# Patient Record
Sex: MALE | Race: WHITE | NOT HISPANIC OR LATINO | Employment: FULL TIME | URBAN - METROPOLITAN AREA
[De-identification: names, ages, dates, MRNs, and addresses within clinical notes are randomized per-mention and may not be internally consistent; named-entity substitution may affect disease eponyms.]

---

## 2017-09-26 ENCOUNTER — HOSPITAL ENCOUNTER (OUTPATIENT)
Dept: LAB | Facility: MEDICAL CENTER | Age: 37
End: 2017-09-26
Payer: COMMERCIAL

## 2017-09-26 LAB
BDY FAT % MEASURED: 15.6 %
BP DIAS: 75 MMHG
BP SYS: 133 MMHG
CHOLEST SERPL-MCNC: 156 MG/DL (ref 100–199)
DIABETES HTDIA: NO
EVENT NAME HTEVT: NORMAL
FASTING HTFAS: YES
GLUCOSE SERPL-MCNC: 77 MG/DL (ref 65–99)
HDLC SERPL-MCNC: 59 MG/DL
HYPERTENSION HTHYP: NO
LDLC SERPL CALC-MCNC: 76 MG/DL
SCREENING LOC CITY HTCIT: NORMAL
SCREENING LOC STATE HTSTA: NORMAL
SCREENING LOCATION HTLOC: NORMAL
SMOKING HTSMO: NO
SUBSCRIBER ID HTSID: NORMAL
TRIGL SERPL-MCNC: 107 MG/DL (ref 0–149)

## 2017-09-26 PROCEDURE — 80061 LIPID PANEL: CPT

## 2017-09-26 PROCEDURE — S5190 WELLNESS ASSESSMENT BY NONPH: HCPCS

## 2017-09-26 PROCEDURE — 82947 ASSAY GLUCOSE BLOOD QUANT: CPT

## 2017-09-26 PROCEDURE — 36415 COLL VENOUS BLD VENIPUNCTURE: CPT

## 2018-09-25 ENCOUNTER — HOSPITAL ENCOUNTER (OUTPATIENT)
Dept: LAB | Facility: MEDICAL CENTER | Age: 38
End: 2018-09-25
Payer: COMMERCIAL

## 2018-09-25 LAB
BDY FAT % MEASURED: 13 %
BP DIAS: 73 MMHG
BP SYS: 119 MMHG
CHOLEST SERPL-MCNC: 212 MG/DL (ref 100–199)
DIABETES HTDIA: NO
EVENT NAME HTEVT: NORMAL
FASTING HTFAS: YES
GLUCOSE SERPL-MCNC: 115 MG/DL (ref 65–99)
HDLC SERPL-MCNC: 72 MG/DL
HYPERTENSION HTHYP: NO
LDLC SERPL CALC-MCNC: 124 MG/DL
SCREENING LOC CITY HTCIT: NORMAL
SCREENING LOC STATE HTSTA: NORMAL
SCREENING LOCATION HTLOC: NORMAL
SMOKING HTSMO: NO
SUBSCRIBER ID HTSID: NORMAL
TRIGL SERPL-MCNC: 80 MG/DL (ref 0–149)

## 2018-09-25 PROCEDURE — 80061 LIPID PANEL: CPT

## 2018-09-25 PROCEDURE — 82947 ASSAY GLUCOSE BLOOD QUANT: CPT

## 2018-09-25 PROCEDURE — 36415 COLL VENOUS BLD VENIPUNCTURE: CPT

## 2018-09-25 PROCEDURE — S5190 WELLNESS ASSESSMENT BY NONPH: HCPCS

## 2020-01-02 PROBLEM — M25.562 KNEE PAIN, LEFT: Status: ACTIVE | Noted: 2020-01-02

## 2020-02-17 PROBLEM — N45.2 ORCHITIS: Status: ACTIVE | Noted: 2020-02-17

## 2020-02-17 PROBLEM — R35.0 FREQUENCY OF MICTURITION: Status: ACTIVE | Noted: 2020-02-17

## 2022-04-26 ENCOUNTER — APPOINTMENT (RX ONLY)
Dept: URBAN - METROPOLITAN AREA CLINIC 38 | Facility: CLINIC | Age: 42
Setting detail: DERMATOLOGY
End: 2022-04-26

## 2022-04-26 DIAGNOSIS — Z71.89 OTHER SPECIFIED COUNSELING: ICD-10-CM

## 2022-04-26 DIAGNOSIS — D18.0 HEMANGIOMA: ICD-10-CM | Status: STABLE

## 2022-04-26 DIAGNOSIS — L81.4 OTHER MELANIN HYPERPIGMENTATION: ICD-10-CM | Status: STABLE

## 2022-04-26 DIAGNOSIS — D22 MELANOCYTIC NEVI: ICD-10-CM | Status: STABLE

## 2022-04-26 PROBLEM — D22.9 MELANOCYTIC NEVI, UNSPECIFIED: Status: ACTIVE | Noted: 2022-04-26

## 2022-04-26 PROBLEM — D18.01 HEMANGIOMA OF SKIN AND SUBCUTANEOUS TISSUE: Status: ACTIVE | Noted: 2022-04-26

## 2022-04-26 PROCEDURE — 99203 OFFICE O/P NEW LOW 30 MIN: CPT

## 2022-04-26 PROCEDURE — ? PHOTO-DOCUMENTATION

## 2022-04-26 PROCEDURE — ? COUNSELING

## 2022-04-26 ASSESSMENT — LOCATION SIMPLE DESCRIPTION DERM
LOCATION SIMPLE: LEFT ANTERIOR NECK
LOCATION SIMPLE: POSTERIOR NECK
LOCATION SIMPLE: ABDOMEN
LOCATION SIMPLE: INFERIOR FOREHEAD
LOCATION SIMPLE: LEFT FOREARM
LOCATION SIMPLE: RIGHT FOREARM

## 2022-04-26 ASSESSMENT — LOCATION DETAILED DESCRIPTION DERM
LOCATION DETAILED: PERIUMBILICAL SKIN
LOCATION DETAILED: RIGHT MEDIAL TRAPEZIAL NECK
LOCATION DETAILED: INFERIOR MID FOREHEAD
LOCATION DETAILED: RIGHT PROXIMAL DORSAL FOREARM
LOCATION DETAILED: LEFT CLAVICULAR NECK
LOCATION DETAILED: LEFT DISTAL DORSAL FOREARM

## 2022-04-26 ASSESSMENT — LOCATION ZONE DERM
LOCATION ZONE: ARM
LOCATION ZONE: NECK
LOCATION ZONE: TRUNK
LOCATION ZONE: FACE

## 2022-04-26 NOTE — HPI: FULL BODY SKIN EXAMINATION
How Severe Are Your Spot(S)?: mild
What Type Of Note Output Would You Prefer (Optional)?: Bullet Format
What Is The Reason For Today's Visit?: Full Body Skin Examination
What Is The Reason For Today's Visit? (Being Monitored For X): concerning skin lesions on an annual basis
Additional History: Patient wants back looked at due to spots he feels.

## 2022-04-26 NOTE — PROCEDURE: PHOTO-DOCUMENTATION
Photo Preface (Leave Blank If You Do Not Want): Photographs were obtained today
Details (Free Text): Photos of back were taken.
Detail Level: Zone

## 2023-09-26 PROBLEM — N45.2 ORCHITIS: Status: RESOLVED | Noted: 2020-02-17 | Resolved: 2023-09-26

## 2023-09-26 PROBLEM — S82.002D CLOSED NONDISPLACED FRACTURE OF LEFT PATELLA WITH ROUTINE HEALING: Status: ACTIVE | Noted: 2018-09-24

## 2023-09-26 PROBLEM — S82.002D CLOSED NONDISPLACED FRACTURE OF LEFT PATELLA WITH ROUTINE HEALING: Status: RESOLVED | Noted: 2018-09-24 | Resolved: 2023-09-26

## 2024-02-24 ENCOUNTER — APPOINTMENT (OUTPATIENT)
Dept: RADIOLOGY | Facility: MEDICAL CENTER | Age: 44
End: 2024-02-24
Attending: EMERGENCY MEDICINE
Payer: COMMERCIAL

## 2024-02-24 ENCOUNTER — HOSPITAL ENCOUNTER (OUTPATIENT)
Dept: RADIOLOGY | Facility: MEDICAL CENTER | Age: 44
End: 2024-02-24

## 2024-02-24 ENCOUNTER — HOSPITAL ENCOUNTER (EMERGENCY)
Facility: MEDICAL CENTER | Age: 44
End: 2024-02-24
Attending: EMERGENCY MEDICINE
Payer: COMMERCIAL

## 2024-02-24 ENCOUNTER — HOSPITAL ENCOUNTER (EMERGENCY)
Facility: MEDICAL CENTER | Age: 44
End: 2024-02-29

## 2024-02-24 VITALS
HEIGHT: 74 IN | HEART RATE: 60 BPM | WEIGHT: 200 LBS | BODY MASS INDEX: 25.67 KG/M2 | RESPIRATION RATE: 18 BRPM | DIASTOLIC BLOOD PRESSURE: 61 MMHG | SYSTOLIC BLOOD PRESSURE: 132 MMHG | OXYGEN SATURATION: 95 % | TEMPERATURE: 97.5 F

## 2024-02-24 DIAGNOSIS — R20.2 NUMBNESS AND TINGLING IN RIGHT HAND: ICD-10-CM

## 2024-02-24 DIAGNOSIS — I70.90 ARTERIAL OCCLUSION: ICD-10-CM

## 2024-02-24 DIAGNOSIS — S43.004A DISLOCATION OF RIGHT SHOULDER JOINT, INITIAL ENCOUNTER: ICD-10-CM

## 2024-02-24 DIAGNOSIS — R20.0 NUMBNESS AND TINGLING IN RIGHT HAND: ICD-10-CM

## 2024-02-24 LAB
ABO + RH BLD: NORMAL
ABO GROUP BLD: NORMAL
ALBUMIN SERPL BCP-MCNC: 3.6 G/DL (ref 3.2–4.9)
ALBUMIN/GLOB SERPL: 2 G/DL
ALP SERPL-CCNC: 44 U/L (ref 30–99)
ALT SERPL-CCNC: 19 U/L (ref 2–50)
ANION GAP SERPL CALC-SCNC: 10 MMOL/L (ref 7–16)
APTT PPP: 25.1 SEC (ref 24.7–36)
AST SERPL-CCNC: 40 U/L (ref 12–45)
BILIRUB SERPL-MCNC: 2.4 MG/DL (ref 0.1–1.5)
BLD GP AB SCN SERPL QL: NORMAL
BUN SERPL-MCNC: 11 MG/DL (ref 8–22)
CALCIUM ALBUM COR SERPL-MCNC: 8 MG/DL (ref 8.5–10.5)
CALCIUM SERPL-MCNC: 7.7 MG/DL (ref 8.5–10.5)
CHLORIDE SERPL-SCNC: 106 MMOL/L (ref 96–112)
CO2 SERPL-SCNC: 21 MMOL/L (ref 20–33)
CREAT SERPL-MCNC: 0.7 MG/DL (ref 0.5–1.4)
ERYTHROCYTE [DISTWIDTH] IN BLOOD BY AUTOMATED COUNT: 40.5 FL (ref 35.9–50)
ETHANOL BLD-MCNC: <10.1 MG/DL
GFR SERPLBLD CREATININE-BSD FMLA CKD-EPI: 118 ML/MIN/1.73 M 2
GLOBULIN SER CALC-MCNC: 1.8 G/DL (ref 1.9–3.5)
GLUCOSE SERPL-MCNC: 102 MG/DL (ref 65–99)
HCT VFR BLD AUTO: 33.5 % (ref 42–52)
HGB BLD-MCNC: 11.4 G/DL (ref 14–18)
INR PPP: 1.16 (ref 0.87–1.13)
MCH RBC QN AUTO: 30 PG (ref 27–33)
MCHC RBC AUTO-ENTMCNC: 34 G/DL (ref 32.3–36.5)
MCV RBC AUTO: 88.2 FL (ref 81.4–97.8)
PLATELET # BLD AUTO: 155 K/UL (ref 164–446)
PMV BLD AUTO: 10.6 FL (ref 9–12.9)
POTASSIUM SERPL-SCNC: 3.8 MMOL/L (ref 3.6–5.5)
POTASSIUM SERPL-SCNC: 4 MMOL/L (ref 3.6–5.5)
PROT SERPL-MCNC: 5.4 G/DL (ref 6–8.2)
PROTHROMBIN TIME: 14.9 SEC (ref 12–14.6)
RBC # BLD AUTO: 3.8 M/UL (ref 4.7–6.1)
RH BLD: NORMAL
SODIUM SERPL-SCNC: 137 MMOL/L (ref 135–145)
WBC # BLD AUTO: 6.7 K/UL (ref 4.8–10.8)

## 2024-02-24 PROCEDURE — 82077 ASSAY SPEC XCP UR&BREATH IA: CPT

## 2024-02-24 PROCEDURE — 85730 THROMBOPLASTIN TIME PARTIAL: CPT

## 2024-02-24 PROCEDURE — 99221 1ST HOSP IP/OBS SF/LOW 40: CPT | Performed by: STUDENT IN AN ORGANIZED HEALTH CARE EDUCATION/TRAINING PROGRAM

## 2024-02-24 PROCEDURE — 85027 COMPLETE CBC AUTOMATED: CPT

## 2024-02-24 PROCEDURE — 86901 BLOOD TYPING SEROLOGIC RH(D): CPT

## 2024-02-24 PROCEDURE — 71275 CT ANGIOGRAPHY CHEST: CPT

## 2024-02-24 PROCEDURE — 80053 COMPREHEN METABOLIC PANEL: CPT

## 2024-02-24 PROCEDURE — 73206 CT ANGIO UPR EXTRM W/O&W/DYE: CPT | Mod: RT

## 2024-02-24 PROCEDURE — 305948 HCHG GREEN TRAUMA ACT PRE-NOTIFY NO CC

## 2024-02-24 PROCEDURE — 86850 RBC ANTIBODY SCREEN: CPT

## 2024-02-24 PROCEDURE — 700117 HCHG RX CONTRAST REV CODE 255: Performed by: EMERGENCY MEDICINE

## 2024-02-24 PROCEDURE — 96375 TX/PRO/DX INJ NEW DRUG ADDON: CPT

## 2024-02-24 PROCEDURE — 86900 BLOOD TYPING SEROLOGIC ABO: CPT

## 2024-02-24 PROCEDURE — 36415 COLL VENOUS BLD VENIPUNCTURE: CPT

## 2024-02-24 PROCEDURE — 99285 EMERGENCY DEPT VISIT HI MDM: CPT

## 2024-02-24 PROCEDURE — 85610 PROTHROMBIN TIME: CPT

## 2024-02-24 PROCEDURE — 99283 EMERGENCY DEPT VISIT LOW MDM: CPT | Performed by: SURGERY

## 2024-02-24 PROCEDURE — 96374 THER/PROPH/DIAG INJ IV PUSH: CPT

## 2024-02-24 PROCEDURE — 700111 HCHG RX REV CODE 636 W/ 250 OVERRIDE (IP): Mod: JZ | Performed by: EMERGENCY MEDICINE

## 2024-02-24 PROCEDURE — 84132 ASSAY OF SERUM POTASSIUM: CPT

## 2024-02-24 RX ORDER — ONDANSETRON 2 MG/ML
4 INJECTION INTRAMUSCULAR; INTRAVENOUS ONCE
Status: COMPLETED | OUTPATIENT
Start: 2024-02-24 | End: 2024-02-24

## 2024-02-24 RX ORDER — HYDROMORPHONE HYDROCHLORIDE 1 MG/ML
1 INJECTION, SOLUTION INTRAMUSCULAR; INTRAVENOUS; SUBCUTANEOUS ONCE
Status: COMPLETED | OUTPATIENT
Start: 2024-02-24 | End: 2024-02-24

## 2024-02-24 RX ADMIN — ONDANSETRON 4 MG: 2 INJECTION INTRAMUSCULAR; INTRAVENOUS at 13:00

## 2024-02-24 RX ADMIN — HYDROMORPHONE HYDROCHLORIDE 1 MG: 1 INJECTION, SOLUTION INTRAMUSCULAR; INTRAVENOUS; SUBCUTANEOUS at 13:00

## 2024-02-24 RX ADMIN — IOHEXOL 100 ML: 350 INJECTION, SOLUTION INTRAVENOUS at 12:10

## 2024-02-24 NOTE — ED NOTES
Pt roomed to 10 from trauma bay. Report taken from HEIDI Alejandra. Pt is a&o, resps even and unlabored. Pt notes numb/tingling sensation to right arm; rt fingers sensitive to light touch. +2 right radial pulse. +4 non-pitting edema noted to right arm and chest. Pt notes pain to right arm and chest, declines pain medication. MD obtaining records from [facility]. All monitors in place. Call light in reach. Pt a&o, resps even and unlabored, miguel

## 2024-02-24 NOTE — ED NOTES
Lab informed RN that potassium is partially hemolyzed; level of 4. MD sanches'd lab to release chem panel, instructed RN to redraw potassium at this time.

## 2024-02-24 NOTE — ED TRIAGE NOTES
"Chief Complaint   Patient presents with    Trauma Green     Camacho Lawrence #13. 43 y.o. M involved in snowboarding accident yesterday at 0930. Patient went to [facility] for a dislocated right shoulder that was subsequently reduced. Patient was then sent home and called by [facility] this morning around 10AM telling him to go to [facility] ED for further evaluation of a missed arterial injury on the right with a hematoma. Patient was picked up by [facility] medics from home and transported to [facility] ED.      + Helmet and - Thinners.     /71   Pulse 65   Temp 37.1 °C (98.7 °F) (Temporal)   Resp 13   Ht 1.88 m (6' 2\")   Wt 90.7 kg (200 lb)   SpO2 95% Comment: 2L NC  BMI 25.68 kg/m²     "

## 2024-02-24 NOTE — CONSULTS
43-year-old male skiing accident with right glenohumeral joint dislocation reduced at outside facility.  Patient was told to come into the ED after reduction due to reported scan findings of posterior circumflex artery occlusion.    CT a reviewed by myself showed no evidence of fracture or dislocation of the shoulder.  Anatomic and concentric reduction of the glenohumeral joint.  No surgical indication from an orthopedic standpoint  Discussed with ED physician who is also going to touch base with vascular surgery regarding recommendations.  From an orthopedic standpoint recommend sling for comfort and follow-up as outpatient      Lars Quiñones MD  Orthopedic Trauma Surgery'

## 2024-02-24 NOTE — ED NOTES
CT aware pt has PIV to left AC ; previously charted in error to right arm. CT aware pt is ready for scan.

## 2024-02-24 NOTE — ED NOTES
Pt medicated per emar, tolerated well. Pain level to right arm down from 7/10 to 4/10. Cms remains intact. Wife at bedside.

## 2024-02-24 NOTE — ED NOTES
Patient YANCY Lawrence #13. 43 y.o. M involved in snowboarding accident yesterday at 0930. Patient went to [facility] for a dislocated right shoulder that was subsequently reduced. Patient was then sent home and called by [facility] this morning around 10AM telling him to go to [facility] ED for further evaluation of a missed possible thoracic arterial injury on the right with a hematoma. [facility] from [facility] and transported to [facility] ED.     + Helmet and - Thinners.       Patient arrives w/ *no spinal immobilizations* in place, sling in place.  Chief complaint of pain to right shoulder  Medications administered en route: none    Per patient:  Home Medications: Ibuprofen  Allergies: Ketamine  Medical Hx: none

## 2024-02-24 NOTE — ED NOTES
Cms intact to right upper arm, +3 right radial pulse, no increase in swelling to right arm. Sling remains in place. Discharge updated with vascular and trauma follow ups. Pt given written and verbal discharge education regarding follow up, return criteria, and orthopedic care. Pt a&o, resps even and unlabored, denies pain. Pt educated to abstain from driving today d/t narcotic administered. Pt ambulatory to discharge with steady gait, accompanied by wife.

## 2024-02-24 NOTE — CONSULTS
CHIEF COMPLAINT: Right upper extremity hematoma after shoulder dislocation.     HISTORY OF PRESENT ILLNESS: The patient is a 43 year-old White man who was backcountry snowboarding yesterday when he crashed and dislocated his right shoulder.  Patient had a relatively long track out and went to [facility] hospital where they were unable to reduce his shoulder and transported him to [facility].  While there his shoulder was reduced and CT scan was performed showing no intra-articular foreign body so he was discharged home with orthopedic follow-up.  Imaging was reviewed this morning and there was concern for vascular injury so patient was notified and he was transported here for urgent evaluation.  On arrival patient states he has fairly significant swelling of the shoulder area which has increased since last night.  He denies any forearm hand or wrist pain, tingling, numbness or pallor.  He has been in immobilizer since last night.  He the area is quite tender over the shoulder and upper arm.    TRIAGE CATEGORY: The patient was triaged as a Trauma Green Transfer Activation. The patient was initially evaluated at [facility] in [place], [place] where CT imaging demonstrated possible vascular injury in the right shoulder area. He was transported to [facility] in [place], [place] for a Trauma Surgery trauma evaluation. An expeditious primary and secondary survey with required adjuncts was conducted. See Trauma Narrator for full details.    PAST MEDICAL HISTORY:  has a past medical history of Asthma.    PAST SURGICAL HISTORY:  has no past surgical history on file.    ALLERGIES: No Known Allergies    CURRENT MEDICATIONS:   Home Medications       Reviewed by Justyn Coppola R.N. (Registered Nurse) on 02/24/24 at 1136  Med List Status: Not Addressed     Medication Last Dose Status        Patient Willem Taking any Medications                         FAMILY HISTORY: family history is not on  "file.    SOCIAL HISTORY:  reports that he has never smoked. He has never used smokeless tobacco. He reports current alcohol use. He reports current drug use.    REVIEW OF SYSTEMS: Review of systems is remarkable for the following right shoulder pain and swelling without pain pallor or pulselessness distally. The remainder of the comprehensive ROS is negative with the exception of the aforementioned HPI, PMH, and PSH bullets in accordance with CMS guideline.    PHYSICAL EXAMINATION:      Vital Signs: /73   Pulse 69   Temp 37.1 °C (98.7 °F) (Temporal)   Resp 14   Ht 1.88 m (6' 2\")   Wt 90.7 kg (200 lb)   SpO2 97%   Physical Exam  Vitals and nursing note reviewed.   HENT:      Head: Normocephalic and atraumatic.      Mouth/Throat:      Mouth: Mucous membranes are moist.      Pharynx: Oropharynx is clear.   Eyes:      Extraocular Movements: Extraocular movements intact.      Conjunctiva/sclera: Conjunctivae normal.   Cardiovascular:      Rate and Rhythm: Normal rate and regular rhythm.      Pulses: Normal pulses.   Pulmonary:      Effort: Pulmonary effort is normal.      Breath sounds: Normal breath sounds. No stridor.   Abdominal:      General: There is no distension.      Palpations: Abdomen is soft.      Tenderness: There is no abdominal tenderness.   Musculoskeletal:      Cervical back: Normal range of motion and neck supple.      Comments: Left upper extremity bilateral lower extremities without sign of injury  Right upper extremity in a shoulder immobilizer.  Is a fair amount of swelling over the deltoid area extending down to the posterior chest wall deep to and lateral to the scapula.  There is fair amount of swelling of the upper arm as well.  Well swelling is significant, it is not tense.  There is some mild ulnar nerve deficit but pulses are strong.  Remainder sensorimotor exam is preserved.   Skin:     General: Skin is warm and dry.   Neurological:      General: No focal deficit present.      " Mental Status: He is alert and oriented to person, place, and time.      Sensory: No sensory deficit.      Motor: No weakness.         LABORATORY VALUES:   Recent Labs     02/24/24  1115   WBC 6.7   RBC 3.80*   HEMOGLOBIN 11.4*   HEMATOCRIT 33.5*   MCV 88.2   MCH 30.0   MCHC 34.0   RDW 40.5   PLATELETCT 155*   MPV 10.6     Recent Labs     02/24/24  1115 02/24/24  1233   SODIUM 137  --    POTASSIUM 4.0 3.8   CHLORIDE 106  --    CO2 21  --    GLUCOSE 102*  --    BUN 11  --    CREATININE 0.70  --    CALCIUM 7.7*  --      Recent Labs     02/24/24  1115   ASTSGOT 40   ALTSGPT 19   TBILIRUBIN 2.4*   ALKPHOSPHAT 44   GLOBULIN 1.8*   INR 1.16*     Recent Labs     02/24/24  1115   APTT 25.1   INR 1.16*        IMAGING:   CT-CTA UPPER EXT WITH & W/O-POST PROCESS RIGHT   Final Result      1. ?Truncation of the posterior circumflex artery is noted 2 cm beyond its origin from the distal axillary artery. Traumatic occlusion or thrombosis or dissection are possibilities. No active arterial extravasation is currently appreciated.   ?   2. ?Reconstitution of branches of this artery located approximately 1 to 2 cm from the site of occlusion.   ?   3. ?Induration in the axillary fat and fat of right lateral chest wall is noted which could be a result of arterial injury.   4. No other arterial injury identified.         CT-CTA CHEST WITH & W/O-POST PROCESS   Final Result      1.  Truncation of the posterior circumflex artery is noted 2 cm beyond its origin from the distal axillary artery. Traumatic occlusion or thrombosis or dissection are possibilities. No active arterial extravasation is currently appreciated.      2.  Reconstitution of branches of this artery located approximately 1 to 2 cm from the site of occlusion.      3.  Induration in the axillary fat and fat of right lateral chest wall is noted which could be a result of arterial injury.      4.  Probable discoid atelectasis in each lung base, greater on the right side.       5.  No evidence of aortic injury.                ASSESSMENT AND PLAN:   43-year-old male status post reduction of shoulder dislocation with fair amount of swelling around the shoulder, upper arm and periscapular back.  CTA does not seem to indicate that there is any critical vascular injury other than some compression of the circumflex scapular which has multiple collaterals and good distal reconstitution.  Does not appear to have any compartment syndrome and swelling has not gotten any worse since this morning.  I have discussed the case with vascular surgery and reviewed films with them.  I also discussed the case with orthopedic surgery who agrees that the patient can probably be discharged home with appropriate supervision.  Patient's significant other is a nurse at [employer] and is well aware of what orthopedic and vascular signs to look out for which would prompt return to the emergency room.  Patient would like to be discharged home.    Hard signs of vascular compromise reviewed with patient and his girlfriend.  Indication to go to the emergency room were also reviewed again prior to discharge.    DISPOSITION: Discharge to home.  Call or return to the Emergency Department for recurrent or worsening symptoms.           ____________________________________     Bola Blake D.O.    DD: 2/24/2024  2:32 PM

## 2024-02-24 NOTE — DISCHARGE INSTRUCTIONS
Monitor for worsening swelling or worsening pain. Please return immediately to ED or dial 911 if this were to occur.     Please discuss the following findings with your regular doctor:  CT-CTA UPPER EXT WITH & W/O-POST PROCESS RIGHT   Final Result      1. ?Truncation of the posterior circumflex artery is noted 2 cm beyond its origin from the distal axillary artery. Traumatic occlusion or thrombosis or dissection are possibilities. No active arterial extravasation is currently appreciated.   ?   2. ?Reconstitution of branches of this artery located approximately 1 to 2 cm from the site of occlusion.   ?   3. ?Induration in the axillary fat and fat of right lateral chest wall is noted which could be a result of arterial injury.   4. No other arterial injury identified.         CT-CTA CHEST WITH & W/O-POST PROCESS   Final Result      1.  Truncation of the posterior circumflex artery is noted 2 cm beyond its origin from the distal axillary artery. Traumatic occlusion or thrombosis or dissection are possibilities. No active arterial extravasation is currently appreciated.      2.  Reconstitution of branches of this artery located approximately 1 to 2 cm from the site of occlusion.      3.  Induration in the axillary fat and fat of right lateral chest wall is noted which could be a result of arterial injury.      4.  Probable discoid atelectasis in each lung base, greater on the right side.      5.  No evidence of aortic injury.              Labs Reviewed   PROTHROMBIN TIME - Abnormal; Notable for the following components:       Result Value    PT 14.9 (*)     INR 1.16 (*)     All other components within normal limits   COMP METABOLIC PANEL - Abnormal; Notable for the following components:    Glucose 102 (*)     Calcium 7.7 (*)     Correct Calcium 8.0 (*)     Total Bilirubin 2.4 (*)     Total Protein 5.4 (*)     Globulin 1.8 (*)     All other components within normal limits   CBC WITHOUT DIFFERENTIAL - Abnormal; Notable  for the following components:    RBC 3.80 (*)     Hemoglobin 11.4 (*)     Hematocrit 33.5 (*)     Platelet Count 155 (*)     All other components within normal limits   APTT   DIAGNOSTIC ALCOHOL   COD (ADULT)   ABO RH CONFIRM   ESTIMATED GFR   POTASSIUM SERUM (K)

## 2024-02-24 NOTE — ED PROVIDER NOTES
ER Provider Note    Scribed for Dandre Flores M.d. by Kylie Morrison. 2/24/2024  11:26 AM    Primary Care Provider: No primary care provider   CHIEF COMPLAINT  Chief Complaint   Patient presents with    Trauma Green     Patient YANCY Lawrence #13. 43 y.o. M involved in snowboarding accident yesterday at 0930. Patient went to [facility] for a dislocated right shoulder that was subsequently reduced. Patient was then sent home and called by [facility] this morning around 10AM telling him to go to [facility] ED for further evaluation of a missed arterial injury on the right with a hematoma. Patient was picked up by [facility] medics from home and transported to [facility].      + Helmet and - Thinners.         HPI/ROS  LIMITATION TO HISTORY   Select: : None  OUTSIDE HISTORIAN(S):  EMS reports additional history.    Jef Lindsay is a 43 y.o. adult who presents to the ED as a transfer from [facility] for evidence of vascular compromise onset one day ago. Ems reports that 9:30 AM yesterday morning the patient fell while snowboarding. The patient notes he was wearing a helmet, denies taking a blood thinner, and denies loss of consciousness. [facility] then [facility] [place] who reduced his right shoulder following this accident and had imaging done. The patient was then discharged home. However, this morning the patient received a call from [facility] noting some vascular compromises found on the CT imaging. Patient notes also having some pain to his chest with slight shortness of breath. He notes having tingling sensations throughout his toes and fingers. EMS reports giving the patient Zofran en route and the patient reports he vomited three times at home today. He notes [facility] prescribed him Percocet medication.     PAST MEDICAL HISTORY  Past Medical History:   Diagnosis Date    Asthma        SURGICAL HISTORY  History reviewed. No pertinent surgical history.    FAMILY HISTORY  History reviewed. No pertinent family  "history.    SOCIAL HISTORY   reports that he has never smoked. He has never used smokeless tobacco. He reports current alcohol use. He reports current drug use.    CURRENT MEDICATIONS  Previous Medications    No medications noted     ALLERGIES  Patient has no allergy information on record.    PHYSICAL EXAM  /66   Pulse 67   Temp 37.1 °C (98.7 °F) (Temporal)   Resp 12   Ht 1.88 m (6' 2\")   Wt 90.7 kg (200 lb)   SpO2 95% Comment: 2L NC  BMI 25.68 kg/m²   Constitutional: Well appearing well-nourished, no apparent distress, no evidence of shock,   HENT:  Normocephalic, atraumatic, no Boyce sign, raccoon eyes or evidence of CSF drainage, mouth is intact with normal dentition  Eyes: PERRLA, EOMI,   Neck: No midline tenderness or step-offs  Cardiovascular: Regular rate and rhythm, No murmurs, No rubs, No gallops.   Thorax & Lungs:  chest wall tenderness. Normal chest excursions no paradoxical motion, no subcutaneous emphysema, the breath sounds are clear and equal bilaterally, no wheezes, rhonchi, or rales  Abdomen: Abdomen no distention, ecchymosis, The abdomen is normal in appearance normal bowel sounds. There is no rigidity, no rebound  Skin: No lesions or gross deformity noted  Back: No tenderness to palpation along the thoracic or lumbar spine at midline, no deformities noted,  :   No CVA tenderness.   Extremities: Clean, dry, intact dressing over right elbow. Right arm in a sling. 2+ peripheral pulses to the upper extremity at level of the wrist.  Diminished sensation to the right hand.  Normal radial, median, and ulnar nerve function bilaterally. Less than 3 second capillary refill and 2+ peripheral pulses bilaterally. Normal flexion and extension.  SILT distally.  Tenderness to the mid right humerus with diffuse ecchymosis and swelling from the elbow to right shoulder.   Pelvis: No laxity or tenderness with palpation or compression  Neurologic: Patient is alert and oriented to person place and time. " Cranial nerves III through XII are intact. Sensory and motor functions are intact. Strength is 5 out of 5 for flexion and extension in all 4 extremities. No evidence of incontinence.  Psychiatric: Affect normal, Judgment normal, Mood normal.      DIAGNOSTIC STUDIES    Labs:   Labs Reviewed   PROTHROMBIN TIME - Abnormal; Notable for the following components:       Result Value    PT 14.9 (*)     INR 1.16 (*)     All other components within normal limits   COMP METABOLIC PANEL - Abnormal; Notable for the following components:    Glucose 102 (*)     Calcium 7.7 (*)     Correct Calcium 8.0 (*)     Total Bilirubin 2.4 (*)     Total Protein 5.4 (*)     Globulin 1.8 (*)     All other components within normal limits   CBC WITHOUT DIFFERENTIAL - Abnormal; Notable for the following components:    RBC 3.80 (*)     Hemoglobin 11.4 (*)     Hematocrit 33.5 (*)     Platelet Count 155 (*)     All other components within normal limits   APTT   DIAGNOSTIC ALCOHOL   COD (ADULT)   ABO RH CONFIRM   ESTIMATED GFR   POTASSIUM SERUM (K)      Radiology:   The attending emergency physician has independently interpreted the diagnostic imaging associated with this visit and am waiting the final reading from the radiologist.   Preliminary interpretation is a follows: Evidence of Arterial occlusion  Radiologist interpretation:   CT-CTA UPPER EXT WITH & W/O-POST PROCESS RIGHT   Final Result      1. ?Truncation of the posterior circumflex artery is noted 2 cm beyond its origin from the distal axillary artery. Traumatic occlusion or thrombosis or dissection are possibilities. No active arterial extravasation is currently appreciated.   ?   2. ?Reconstitution of branches of this artery located approximately 1 to 2 cm from the site of occlusion.   ?   3. ?Induration in the axillary fat and fat of right lateral chest wall is noted which could be a result of arterial injury.   4. No other arterial injury identified.         CT-CTA CHEST WITH & W/O-POST  PROCESS   Final Result      1.  Truncation of the posterior circumflex artery is noted 2 cm beyond its origin from the distal axillary artery. Traumatic occlusion or thrombosis or dissection are possibilities. No active arterial extravasation is currently appreciated.      2.  Reconstitution of branches of this artery located approximately 1 to 2 cm from the site of occlusion.      3.  Induration in the axillary fat and fat of right lateral chest wall is noted which could be a result of arterial injury.      4.  Probable discoid atelectasis in each lung base, greater on the right side.      5.  No evidence of aortic injury.               COURSE & MEDICAL DECISION MAKING     ED Observation Status? No; Patient does not meet criteria for ED Observation.     INITIAL ASSESSMENT, COURSE AND PLAN  Care Narrative:     11:14 AM - Patient was evaluated at bedside; Patient is a 43 y.o. male who presents for evaluation of vascular compromise following a snowboarding accident one day ago, associated with chest wall pain and some shortness of breath.  Discussed with patient and/or family that imaging will be received from [facility], Trauma will be consulted, and we will reevaluate. Advised patient on plan for admission.  Patient agrees to the plan of care.     11:40 AM - I discussed the patient's case and the above findings with Dr. Blake (Trauma) who recommends various labs and imaging be done. He reports that he will consult with Dr. Boyer (Vascular) himself given concern for thoracic aortic occulusion. Ordered CT-CTA upper extremity, CT-CTA chest, Prothrombin time, APTT, Diagnostic alcohol, CMP, and CBC without differential. Patient advised on this plan of care and agrees.    12:38 PM - Ordered Potassium Serum to further evaluate.    1:12 PM - I discussed the patient's case and the above findings with Dr. Blake (Trauma) who would like orthopedics consulted, and notes he is still waiting to hear back from Dr. Boyer.  Also recommends medication for pain control. Patient medicated with Dilaudid 1 mg. Zofran 4 mg.     1:15 PM - Paged Orthopedics.      1:35 PM - I discussed the patient's case and the above findings with Dr. Quiñones (Orthopedics) who is aware of the current plan and agrees, and notes that this will not require surgery or intervention. Advised patient consulted about pain control.      1:41 PM - Patient was reevaluated at bedside. Discussed lab and radiology findings. Advised on pain control. Noted that this will not require surgery or intervention. Wife notes at this time that prior to the patient's shoulder being reduced at [facility], they were at [facility] where the shoulder could not be successfully reduced, and was then sent to [facility]. So, she clarifies his shoulder was not reduced for 10 hours following the incident. Advised patient and wife that the patient will be monitored for pain, and upon reevaluation can be discharged.     2:36 PM - Patient was reevaluated at bedside. Patient denying needing IV pain medication and notes feeling improved with medications that were given. Advised on plan for orthopedic follow up. Wife reports that they were prescribed Percocet already and do not need another prescription. The plan for discharge was discussed. Patient and/or family was given the opportunity to ask any questions. Patient and/or family verbalizes understanding and agreement to this plan of care.        Patient noted to have occlusion likely secondary to dislocation and/or swelling   Vascular surgery consulted along with trauma surgery and orthopedic surgery all who believe following up as an outpatient makes sense and no acute surgical indication at this time  Patient counseled upon concerns for compartment syndrome and return instructions and patient agrees to follow-up with orthopedic surgery as an outpatient  I suspect numbness secondary to prolonged dislocation time of greater than several  hours       DISPOSITION AND DISCUSSIONS  I have discussed management of the patient with the following physicians and STEPH's:  Dr. Blake (Trauma)    Discussion of management with other [facility] or appropriate source(s): None     Escalation of care considered, and ultimately not performed: acute inpatient care management, however at this time, the patient is most appropriate for outpatient management.    Barriers to care at this time, including but not limited to: Patient does not have established PCP.     Decision tools and prescription drugs considered including, but not limited to: Pain Medications Dilaudid, Percocet .    The patient will return for new or worsening symptoms and is stable at the time of discharge.    The patient is referred to a primary physician for blood pressure management, diabetic screening, and for all other preventative health concerns.    DISPOSITION:  Patient will be discharged home in stable condition.    FOLLOW UP:  [facility], Emergency Dept  [address] [address] [phone]    If symptoms worsen    Lars Quiñones M.D.  [address] [address] [phone]      call to schedule orthopedic surgery follow up      FINAL DIAGNOSIS  1. Arterial occlusion    2. Numbness and tingling in right hand    3. Dislocation of right shoulder joint, initial encounter       Kylie ALBERT (Scribe), am scribing for, and in the presence of, Dandre Flores M.D..    Electronically signed by: Kylie Morrison (Scribe), 2/24/2024    Dandre ALBERT M.D. personally performed the services described in this documentation, as scribed by Kylie Morrison in my presence, and it is both accurate and complete.      The note accurately reflects work and decisions made by me.  Dandre Flores M.D.  2/24/2024  6:38 PM

## 2024-03-21 PROBLEM — M75.101 RIGHT ROTATOR CUFF TEAR: Status: ACTIVE | Noted: 2024-03-20

## 2025-07-02 ENCOUNTER — APPOINTMENT (OUTPATIENT)
Dept: URBAN - METROPOLITAN AREA CLINIC 38 | Facility: CLINIC | Age: 45
Setting detail: DERMATOLOGY
End: 2025-07-02

## 2025-07-02 DIAGNOSIS — L72.0 EPIDERMAL CYST: ICD-10-CM

## 2025-07-02 DIAGNOSIS — D22 MELANOCYTIC NEVI: ICD-10-CM

## 2025-07-02 DIAGNOSIS — L82.1 OTHER SEBORRHEIC KERATOSIS: ICD-10-CM

## 2025-07-02 PROBLEM — D22.5 MELANOCYTIC NEVI OF TRUNK: Status: ACTIVE | Noted: 2025-07-02

## 2025-07-02 PROCEDURE — ? COUNSELING

## 2025-07-02 ASSESSMENT — LOCATION SIMPLE DESCRIPTION DERM
LOCATION SIMPLE: RIGHT UPPER BACK
LOCATION SIMPLE: LEFT CHEEK
LOCATION SIMPLE: LEFT EYEBROW
LOCATION SIMPLE: RIGHT CHEEK

## 2025-07-02 ASSESSMENT — LOCATION DETAILED DESCRIPTION DERM
LOCATION DETAILED: LEFT CENTRAL EYEBROW
LOCATION DETAILED: RIGHT MID-UPPER BACK
LOCATION DETAILED: RIGHT SUPERIOR CENTRAL MALAR CHEEK
LOCATION DETAILED: LEFT SUPERIOR CENTRAL MALAR CHEEK

## 2025-07-02 ASSESSMENT — LOCATION ZONE DERM
LOCATION ZONE: TRUNK
LOCATION ZONE: FACE